# Patient Record
Sex: FEMALE | Race: WHITE
[De-identification: names, ages, dates, MRNs, and addresses within clinical notes are randomized per-mention and may not be internally consistent; named-entity substitution may affect disease eponyms.]

---

## 2019-03-19 ENCOUNTER — HOSPITAL ENCOUNTER (OUTPATIENT)
Dept: HOSPITAL 25 - OREAST | Age: 74
Discharge: HOME | End: 2019-03-19
Attending: OPHTHALMOLOGY
Payer: MEDICARE

## 2019-03-19 VITALS — SYSTOLIC BLOOD PRESSURE: 107 MMHG | DIASTOLIC BLOOD PRESSURE: 51 MMHG

## 2019-03-19 DIAGNOSIS — H25.11: Primary | ICD-10-CM

## 2019-03-19 DIAGNOSIS — K74.3: ICD-10-CM

## 2019-03-19 DIAGNOSIS — H52.201: ICD-10-CM

## 2019-03-19 PROCEDURE — V2788 PRESBYOPIA-CORRECT FUNCTION: HCPCS

## 2019-03-19 NOTE — OP
DATE OF OPERATION:  03/19/19 - [facility]

 

DATE OF BIRTH:  [birth year]

 

SURGEON:  Davon Cool MD

 

ASSISTANT:  None.

 

ANESTHESIA:  Topical with intravenous sedation.

 

PRE-OP DIAGNOSIS:  Cataract, right eye, with astigmatism.

 

POST-OP DIAGNOSIS:  Cataract, right eye, with astigmatism.

 

OPERATIVE PROCEDURE:  Phacoemulsification and cataract extraction with 
posterior Toric multifocal lens implant, right eye.

 

COMPLICATIONS:  None.

 

BLOOD LOSS:  None.

 

DESCRIPTION OF PROCEDURE:  The patient was seen preoperatively in the holding 
area where a qiana was made at the 6 o'clock position of the limbus of the right 
eye while the patient was sitting upright.  The patient was subsequently 
brought to the operating room and given a small amount of intravenous sedation 
and a drop of tetracaine into her right eye.  The patient was prepped and 
draped in the usual sterile fashion for ophthalmic surgery and attention was 
directed to the right eye where a speculum was placed.  A paracentesis was 
created at the 11 o'clock position and 0.1 cc of 1% preservative-free lidocaine 
was injected into the anterior chamber followed by DisCoVisc.  The eye was 
digitally stabilized while a 2.75 mm keratome was used to create a triplanar 
clear corneal incision at the 9 o'clock position.  A continuous curvilinear 
capsulorrhexis was created with a cystotome and Utrata forceps.  BSS on a 
cannula was used to hydrodissect the lens from the capsule.  
Phacoemulsification was performed in a divide-and-conquer technique to create 4 
fragments, which were removed.  Residual cortical material was removed with 
irrigation and aspiration. The capsule was polished.  ProVisc was placed into 
the capsular bag in the anterior chamber.  The eye pressure was measured with 
intraoperative tonometer.  The surface of the eye was lubricated.  The ORA 
instrument was employed to help guide lens choice.  A ReSTOR multifocal SV25T4 
21.5 diopter lens was inserted into the capsular bag and rotated to the 12 
degree axis.  The lens was stabilized with a Sinskey hook while irrigation and 
aspiration was performed to remove viscoelastic from the eye.  The Sinskey hook 
was removed.  BSS on a cannula was used to hydrate the corneal stroma and seal 
the wound.  At the end of the case, the pupil was round, the lens was centered 
stable on axial line.  The eye pressure appeared normal and the wound was 
watertight.  The speculum was removed and topical Maxitrol ointment was placed 
on the surface of the eye.  The eye was closed, patched and shielded, and the 
patient was sent to the recovery room in stable condition with postoperative 
instructions and followup appointment given.

 

 [id number] #: [id number]

MTDD

## 2019-03-26 ENCOUNTER — HOSPITAL ENCOUNTER (OUTPATIENT)
Dept: HOSPITAL 25 - OREAST | Age: 74
Discharge: HOME | End: 2019-03-26
Attending: OPHTHALMOLOGY
Payer: MEDICARE

## 2019-03-26 VITALS — SYSTOLIC BLOOD PRESSURE: 105 MMHG | DIASTOLIC BLOOD PRESSURE: 46 MMHG

## 2019-03-26 DIAGNOSIS — H25.12: Primary | ICD-10-CM

## 2019-03-26 DIAGNOSIS — K21.9: ICD-10-CM

## 2019-03-26 DIAGNOSIS — K44.9: ICD-10-CM

## 2019-03-26 DIAGNOSIS — E78.5: ICD-10-CM

## 2019-03-26 DIAGNOSIS — K74.3: ICD-10-CM

## 2019-03-26 PROCEDURE — V2788 PRESBYOPIA-CORRECT FUNCTION: HCPCS

## 2019-03-26 NOTE — OP
OPERATIVE REPORT:

 

DATE OF OPERATION:  03/26/19 - [facility]

 

DATE OF BIRTH:  [birth year]

 

SURGEON:  Dr. Davon Cool.

 

ASSISTANT:  None.



ANESTHESIOLOGIST:  Mitzi Velez DO

 

ANESTHESIA:  Topical with intravenous sedation.

 

PRE-OP DIAGNOSIS:  Cataract with mild astigmatism, left eye.

 

POST-OP DIAGNOSIS:  Cataract with mild astigmatism, left eye.

 

OPERATIVE PROCEDURE:  Phacoemulsification and cataract extraction with 
posterior chamber intraocular lens implant, left eye.

 

COMPLICATIONS:  None.

 

ESTIMATED BLOOD LOSS:  None.

 

OPERATIVE FINDINGS:  The patient was seen preoperatively in the holding area 
where a qiana was made at the 6 o'clock position of the limbus with a marking 
pen while she was in an upright position.  The patient was subsequently brought 
to the operating room and given intravenous sedation as well as a drop of 
tetracaine to her left eye.  The patient was prepped and draped in the usual 
sterile fashion for ophthalmic surgery and attention was directed to the left 
eye where a speculum was placed.  A paracentesis was created at the 5 o'clock 
position and 0.1 cc of 1% preservative-free lidocaine was injected into the 
anterior chamber followed by DisCoVisc.  The eye was digitally stabilized while 
a 2.75 mm keratome was used to create a triplanar clear corneal incision at the 
3 o'clock position.  A continuous curvilinear capsulorrhexis was created with a 
cystotome and Utrata forceps.  BSS on a cannula was used to hydrodissect the 
lens from the capsule.  Phacoemulsification was performed in a divide-and-
conquer technique to create 4 fragments which were removed.  Residual cortical 
material was removed with irrigation and aspiration. ProVisc was used to 
inflate the capsular bag and the eye pressure was measured with an 
intraoperative tonometer.  The surface of the eye was lubricated.  The ORA 
instrument was employed to determine whether a toric lens was needed.  The ORA 
instrument pointed toward a spherical lens.  Thus, an SV25T0 20.5 diopter lens 
was folded and inserted into the capsular bag.  Viscoelastic was removed with 
irrigation and aspiration.  BSS on a cannula was used to hydrate the corneal 
stroma and seal the wound.  At the end of the case, the pupil was round.  The 
lens was centered stable and secured.  The eye pressure appeared normal and the 
wound was watertight.  The speculum was removed and topical Maxitrol ointment 
was placed on the surface of the eye.  The eye was closed, patched and shielded 
and the patient was sent to the recovery room in stable condition with postop 
instructions and followup appointment given.

 

 [id number]/CPS #: [id number]

TAINA